# Patient Record
Sex: FEMALE | Race: WHITE | NOT HISPANIC OR LATINO | Employment: FULL TIME
[De-identification: names, ages, dates, MRNs, and addresses within clinical notes are randomized per-mention and may not be internally consistent; named-entity substitution may affect disease eponyms.]

---

## 2017-02-22 ENCOUNTER — RECORDS - HEALTHEAST (OUTPATIENT)
Dept: ADMINISTRATIVE | Facility: OTHER | Age: 32
End: 2017-02-22

## 2017-02-24 ENCOUNTER — RECORDS - HEALTHEAST (OUTPATIENT)
Dept: ADMINISTRATIVE | Facility: OTHER | Age: 32
End: 2017-02-24

## 2017-02-25 ENCOUNTER — RECORDS - HEALTHEAST (OUTPATIENT)
Dept: ADMINISTRATIVE | Facility: OTHER | Age: 32
End: 2017-02-25

## 2017-03-06 ENCOUNTER — RECORDS - HEALTHEAST (OUTPATIENT)
Dept: ADMINISTRATIVE | Facility: OTHER | Age: 32
End: 2017-03-06

## 2017-04-05 ENCOUNTER — RECORDS - HEALTHEAST (OUTPATIENT)
Dept: ADMINISTRATIVE | Facility: OTHER | Age: 32
End: 2017-04-05

## 2017-04-07 ENCOUNTER — RECORDS - HEALTHEAST (OUTPATIENT)
Dept: ADMINISTRATIVE | Facility: OTHER | Age: 32
End: 2017-04-07

## 2017-04-08 ENCOUNTER — RECORDS - HEALTHEAST (OUTPATIENT)
Dept: ADMINISTRATIVE | Facility: OTHER | Age: 32
End: 2017-04-08

## 2017-04-09 ENCOUNTER — RECORDS - HEALTHEAST (OUTPATIENT)
Dept: ADMINISTRATIVE | Facility: OTHER | Age: 32
End: 2017-04-09

## 2017-04-10 ENCOUNTER — RECORDS - HEALTHEAST (OUTPATIENT)
Dept: ADMINISTRATIVE | Facility: OTHER | Age: 32
End: 2017-04-10

## 2017-04-12 ENCOUNTER — AMBULATORY - HEALTHEAST (OUTPATIENT)
Dept: LAB | Facility: CLINIC | Age: 32
End: 2017-04-12

## 2017-04-12 ENCOUNTER — RECORDS - HEALTHEAST (OUTPATIENT)
Dept: ADMINISTRATIVE | Facility: OTHER | Age: 32
End: 2017-04-12

## 2017-04-12 DIAGNOSIS — O20.0 THREATENED ABORTION: ICD-10-CM

## 2017-04-12 DIAGNOSIS — O02.81 INAPPROPRIATE CHANGE IN QUANTITATIVE HCG IN EARLY PREGNANCY: ICD-10-CM

## 2017-04-14 ENCOUNTER — AMBULATORY - HEALTHEAST (OUTPATIENT)
Dept: LAB | Facility: CLINIC | Age: 32
End: 2017-04-14

## 2017-04-14 DIAGNOSIS — O02.81 CHEMICAL PREGNANCY: ICD-10-CM

## 2017-04-14 DIAGNOSIS — O20.0 THREATENED ABORTION, UNSPECIFIED AS TO EPISODE OF CARE: ICD-10-CM

## 2017-04-17 ENCOUNTER — AMBULATORY - HEALTHEAST (OUTPATIENT)
Dept: LAB | Facility: CLINIC | Age: 32
End: 2017-04-17

## 2017-04-17 DIAGNOSIS — O02.81 CHEMICAL PREGNANCY: ICD-10-CM

## 2017-04-17 DIAGNOSIS — O20.0 THREATENED ABORTION, UNSPECIFIED AS TO EPISODE OF CARE: ICD-10-CM

## 2017-04-19 ENCOUNTER — AMBULATORY - HEALTHEAST (OUTPATIENT)
Dept: LAB | Facility: CLINIC | Age: 32
End: 2017-04-19

## 2017-04-19 DIAGNOSIS — O20.0 ABORTION, THREATENED: ICD-10-CM

## 2017-04-21 ENCOUNTER — AMBULATORY - HEALTHEAST (OUTPATIENT)
Dept: LAB | Facility: CLINIC | Age: 32
End: 2017-04-21

## 2017-04-21 ENCOUNTER — TELEPHONE (OUTPATIENT)
Dept: OBGYN | Facility: CLINIC | Age: 32
End: 2017-04-21

## 2017-04-21 ENCOUNTER — INFUSION - HEALTHEAST (OUTPATIENT)
Dept: INFUSION THERAPY | Facility: CLINIC | Age: 32
End: 2017-04-21

## 2017-04-21 DIAGNOSIS — O20.0 THREATENED ABORTION: ICD-10-CM

## 2017-04-21 DIAGNOSIS — O02.81 CHEMICAL PREGNANCY: ICD-10-CM

## 2017-04-21 ASSESSMENT — MIFFLIN-ST. JEOR: SCORE: 1338.18

## 2017-04-21 NOTE — TELEPHONE ENCOUNTER
Patient is pregnant and calling to get a second opinion as she is seen at a clinic in [place]. Discussed with patient that we would need her to send her records to the clinic to be reviewed by the provider and schedule a consult appointment. Pt stated that she needs to be seen sooner than that. Pt unsure of when her LMP was, but stated that she ovulated on 3/15. Pt had 2 ultrasounds done 1.5 weeks apart and neither showed a pregnancy. Pt has had her hcg level checked and it was increasing appropriately, but the last time only increased 50%. Pt's doctor advised that she have surgery or be given Methotrexate due to ectopic pregnancy. Pt doesn't believe she has an ectopic pregnancy and not having any symptoms. Discussed with patient that she should call her current clinic and ask what other options she has - repeat u/s, repeat hcg, etc. Advised patient to present to ED if she does start to have any heavy bleeding or symptoms of ectopic pregnancy. Pt stated that she will call her clinic and if she decides she wants a second opinion she will have her records sent to us.   Mel Martinez RN-BSN

## 2017-04-24 ENCOUNTER — AMBULATORY - HEALTHEAST (OUTPATIENT)
Dept: LAB | Facility: CLINIC | Age: 32
End: 2017-04-24

## 2017-04-24 ENCOUNTER — RECORDS - HEALTHEAST (OUTPATIENT)
Dept: ADMINISTRATIVE | Facility: OTHER | Age: 32
End: 2017-04-24

## 2017-04-24 DIAGNOSIS — O00.90 ECTOPIC PREGNANCY: ICD-10-CM

## 2017-04-24 LAB
ALT SERPL W P-5'-P-CCNC: 14 U/L (ref 0–45)
AST SERPL W P-5'-P-CCNC: 14 U/L (ref 0–40)
CREAT SERPL-MCNC: 0.76 MG/DL (ref 0.6–1.1)
GFR SERPL CREATININE-BSD FRML MDRD: >60 ML/MIN/1.73M2

## 2017-04-27 ENCOUNTER — AMBULATORY - HEALTHEAST (OUTPATIENT)
Dept: LAB | Facility: CLINIC | Age: 32
End: 2017-04-27

## 2017-04-27 DIAGNOSIS — O00.90 UNSPECIFIED ECTOPIC PREGNANCY WITHOUT INTRAUTERINE PREGNANCY: ICD-10-CM

## 2017-04-30 ENCOUNTER — AMBULATORY - HEALTHEAST (OUTPATIENT)
Dept: LAB | Facility: CLINIC | Age: 32
End: 2017-04-30

## 2017-04-30 DIAGNOSIS — O00.90 ECTOPIC FETUS: ICD-10-CM

## 2017-04-30 LAB
ALT SERPL W P-5'-P-CCNC: 17 U/L (ref 0–45)
CREAT SERPL-MCNC: 0.77 MG/DL (ref 0.6–1.1)
GFR SERPL CREATININE-BSD FRML MDRD: >60 ML/MIN/1.73M2

## 2017-05-01 ENCOUNTER — RECORDS - HEALTHEAST (OUTPATIENT)
Dept: ADMINISTRATIVE | Facility: OTHER | Age: 32
End: 2017-05-01

## 2017-05-07 ENCOUNTER — AMBULATORY - HEALTHEAST (OUTPATIENT)
Dept: LAB | Facility: CLINIC | Age: 32
End: 2017-05-07

## 2017-05-07 DIAGNOSIS — O00.90 ECTOPIC PREGNANCY: ICD-10-CM

## 2017-05-17 ENCOUNTER — RECORDS - HEALTHEAST (OUTPATIENT)
Dept: ADMINISTRATIVE | Facility: OTHER | Age: 32
End: 2017-05-17

## 2017-07-17 ENCOUNTER — RECORDS - HEALTHEAST (OUTPATIENT)
Dept: ADMINISTRATIVE | Facility: OTHER | Age: 32
End: 2017-07-17

## 2017-07-31 ENCOUNTER — RECORDS - HEALTHEAST (OUTPATIENT)
Dept: ADMINISTRATIVE | Facility: OTHER | Age: 32
End: 2017-07-31

## 2017-08-30 ENCOUNTER — OFFICE VISIT - HEALTHEAST (OUTPATIENT)
Dept: MIDWIFE SERVICES | Facility: CLINIC | Age: 32
End: 2017-08-30

## 2017-08-30 DIAGNOSIS — Z32.00 POSSIBLE PREGNANCY: ICD-10-CM

## 2017-08-30 ASSESSMENT — MIFFLIN-ST. JEOR: SCORE: 1347.25

## 2017-08-31 ENCOUNTER — COMMUNICATION - HEALTHEAST (OUTPATIENT)
Dept: MIDWIFE SERVICES | Facility: CLINIC | Age: 32
End: 2017-08-31

## 2017-09-01 ENCOUNTER — AMBULATORY - HEALTHEAST (OUTPATIENT)
Dept: LAB | Facility: CLINIC | Age: 32
End: 2017-09-01

## 2017-09-01 DIAGNOSIS — Z32.00 POSSIBLE PREGNANCY: ICD-10-CM

## 2017-09-05 ENCOUNTER — COMMUNICATION - HEALTHEAST (OUTPATIENT)
Dept: MIDWIFE SERVICES | Facility: CLINIC | Age: 32
End: 2017-09-05

## 2017-09-05 ENCOUNTER — AMBULATORY - HEALTHEAST (OUTPATIENT)
Dept: MIDWIFE SERVICES | Facility: CLINIC | Age: 32
End: 2017-09-05

## 2017-09-05 ENCOUNTER — AMBULATORY - HEALTHEAST (OUTPATIENT)
Dept: OBGYN | Facility: CLINIC | Age: 32
End: 2017-09-05

## 2017-09-05 DIAGNOSIS — R79.89 ELEVATED SERUM HCG: ICD-10-CM

## 2017-09-06 ENCOUNTER — AMBULATORY - HEALTHEAST (OUTPATIENT)
Dept: LAB | Facility: CLINIC | Age: 32
End: 2017-09-06

## 2017-09-06 DIAGNOSIS — R79.89 ELEVATED SERUM HCG: ICD-10-CM

## 2017-09-14 ENCOUNTER — COMMUNICATION - HEALTHEAST (OUTPATIENT)
Dept: MIDWIFE SERVICES | Facility: CLINIC | Age: 32
End: 2017-09-14

## 2017-09-14 DIAGNOSIS — Z32.01 PREGNANCY EXAMINATION OR TEST, POSITIVE RESULT: ICD-10-CM

## 2017-09-22 ENCOUNTER — HOSPITAL ENCOUNTER (OUTPATIENT)
Dept: ULTRASOUND IMAGING | Facility: CLINIC | Age: 32
Discharge: HOME | End: 2017-09-22
Attending: ADVANCED PRACTICE MIDWIFE

## 2017-09-22 DIAGNOSIS — Z32.01 PREGNANCY EXAMINATION OR TEST, POSITIVE RESULT: ICD-10-CM

## 2017-09-26 ENCOUNTER — OFFICE VISIT (OUTPATIENT)
Dept: URGENT CARE | Facility: URGENT CARE | Age: 32
End: 2017-09-26
Payer: COMMERCIAL

## 2017-09-26 VITALS
BODY MASS INDEX: 19.7 KG/M2 | HEART RATE: 100 BPM | HEIGHT: 68 IN | WEIGHT: 130 LBS | SYSTOLIC BLOOD PRESSURE: 120 MMHG | DIASTOLIC BLOOD PRESSURE: 69 MMHG | OXYGEN SATURATION: 99 % | TEMPERATURE: 101.9 F

## 2017-09-26 DIAGNOSIS — J06.9 VIRAL URI WITH COUGH: Primary | ICD-10-CM

## 2017-09-26 PROCEDURE — 99213 OFFICE O/P EST LOW 20 MIN: CPT | Performed by: FAMILY MEDICINE

## 2017-09-26 RX ORDER — DIPHENOXYLATE HYDROCHLORIDE AND ATROPINE SULFATE 2.5; .025 MG/1; MG/1
1 TABLET ORAL
COMMUNITY
Start: 2015-11-23

## 2017-09-26 NOTE — MR AVS SNAPSHOT
"              After Visit Summary   2017    Maria Elena Bacon    MRN: [id number]           Patient Information     Date Of Birth          1985        Visit Information        Provider Department      2017 6:55 PM Edgar Dhillon MD [facility]        Today's Diagnoses     Viral URI with cough    -  1       Follow-ups after your visit        Who to contact     If you have questions or need follow up information about today's clinic visit or your schedule please contact [facility] directly at [phone].  Normal or non-critical lab and imaging results will be communicated to you by [employer], letter or phone within 4 business days after the clinic has received the results. If you do not hear from us within 7 days, please contact the clinic through [employer] or phone. If you have a critical or abnormal lab result, we will notify you by phone as soon as possible.  Submit refill requests through [employer] or call your pharmacy and they will forward the refill request to us. Please allow 3 business days for your refill to be completed.          Additional Information About Your Visit        MyChart Information     [employer] lets you send messages to your doctor, view your test results, renew your prescriptions, schedule appointments and more. To sign up, go to [email or web address] . Click on \"Log in\" on the left side of the screen, which will take you to the Welcome page. Then click on \"Sign up Now\" on the right side of the page.     You will be asked to enter the access code listed below, as well as some personal information. Please follow the directions to create your username and password.     Your access code is: [email or web address]  Expires: 2017  7:18 PM     Your access code will  in 90 days. If you need help or a new code, please call your [facility] clinic or [phone].        Care EveryWhere ID     This is your Care EveryWhere ID. This could be " "used by other organizations to access your [facility] medical records  [id number]        Your Vitals Were     Pulse Temperature Height Pulse Oximetry Breastfeeding? BMI (Body Mass Index)    100 101.9  F (38.8  C) (Tympanic) 5' 8\" (1.727 m) 99% No 19.77 kg/m2       Blood Pressure from Last 3 Encounters:   09/26/17 120/69   01/21/16 96/58   05/12/15 111/70    Weight from Last 3 Encounters:   09/26/17 130 lb (59 kg)   01/21/16 131 lb (59.4 kg)   05/12/15 150 lb (68 kg)              Today, you had the following     No orders found for display         Today's Medication Changes          These changes are accurate as of: 9/26/17  7:18 PM.  If you have any questions, ask your nurse or doctor.               Stop taking these medicines if you haven't already. Please contact your care team if you have questions.     ADVIL PO   Stopped by:  Edgar Dhillon MD           azithromycin 250 MG tablet   Commonly known as:  ZITHROMAX   Stopped by:  Edgar Dhillon MD           IRON PO   Stopped by:  Edgar Dhillon MD                    Primary Care Provider    None Specified       No primary provider on file.        Equal Access to Services     [facility]: Hadii alex Estrada, wajagrutida lutaya, qaybta kaalmada ann-marie, kinza bucio . So [facility] [phone].    ATENCIÓN: Si habla español, tiene a jones disposición servicios gratuitos de asistencia lingüística. Llame al [phone].    We comply with applicable federal civil rights laws and [place] laws. We do not discriminate on the basis of race, color, national origin, age, disability sex, sexual orientation or gender identity.            Thank you!     Thank you for choosing [facility]  for your care. Our goal is always to provide you with excellent care. Hearing back from our patients is one way we can continue to improve our services. Please take a few minutes to complete the written survey that you may " receive in the mail after your visit with us. Thank you!             Your Updated Medication List - Protect others around you: Learn how to safely use, store and throw away your medicines at www.disposemymeds.org.          This list is accurate as of: 9/26/17  7:18 PM.  Always use your most recent med list.                   Brand Name Dispense Instructions for use Diagnosis    MULTI-VITAMINS Tabs      Take 1 tablet by mouth

## 2017-09-27 ENCOUNTER — COMMUNICATION - HEALTHEAST (OUTPATIENT)
Dept: MIDWIFE SERVICES | Facility: CLINIC | Age: 32
End: 2017-09-27

## 2017-09-27 DIAGNOSIS — O26.849 FETAL SIZE INCONSISTENT WITH DATES: ICD-10-CM

## 2017-09-27 DIAGNOSIS — N92.6 MISSED MENSES: ICD-10-CM

## 2017-09-27 NOTE — NURSING NOTE
"Chief Complaint   Patient presents with     Urgent Care     Fever     c/o fever,chills and cough for 1 day       Initial /69  Pulse 100  Temp 101.9  F (38.8  C) (Tympanic)  Ht 5' 8\" (1.727 m)  Wt 130 lb (59 kg)  SpO2 99%  Breastfeeding? No  BMI 19.77 kg/m2 Estimated body mass index is 19.77 kg/(m^2) as calculated from the following:    Height as of this encounter: 5' 8\" (1.727 m).    Weight as of this encounter: 130 lb (59 kg).  Medication Reconciliation: complete   Sapphire Perez MA    "

## 2017-09-27 NOTE — PROGRESS NOTES
Subjective: Yesterday she felt cold and then had a temperature in the night, developed cough and congestion, bad taste. She figures it's like a cold but because of fever or if her since she is in early pregnancy. This is her third pregnancy. One of her children at home has had a cold for a couple of weeks.    Objective: She appears well. HEENT is pretty much normal. Neck is normal. Lungs are clear. Coughing periodically. Heart is regular without murmurs    Assessment and plan: Viral URI. It makes logical sense to try to keep the temperature from getting more than 101 and she can use Tylenol when that happens but otherwise this should run its course, watch for secondary infections like sinus infections, but mention this to her OB provider.

## 2017-09-28 ENCOUNTER — HOSPITAL ENCOUNTER (OUTPATIENT)
Dept: ULTRASOUND IMAGING | Facility: CLINIC | Age: 32
Discharge: HOME | End: 2017-09-28
Attending: MIDWIFE

## 2017-09-28 ENCOUNTER — COMMUNICATION - HEALTHEAST (OUTPATIENT)
Dept: MIDWIFE SERVICES | Facility: CLINIC | Age: 32
End: 2017-09-28

## 2017-09-28 ENCOUNTER — COMMUNICATION - HEALTHEAST (OUTPATIENT)
Dept: OBGYN | Facility: CLINIC | Age: 32
End: 2017-09-28

## 2017-09-28 DIAGNOSIS — O26.849 FETAL SIZE INCONSISTENT WITH DATES: ICD-10-CM

## 2017-09-28 DIAGNOSIS — N92.6 MISSED MENSES: ICD-10-CM

## 2017-09-29 ENCOUNTER — COMMUNICATION - HEALTHEAST (OUTPATIENT)
Dept: OBGYN | Facility: CLINIC | Age: 32
End: 2017-09-29

## 2017-10-19 ENCOUNTER — OFFICE VISIT - HEALTHEAST (OUTPATIENT)
Dept: OBGYN | Facility: CLINIC | Age: 32
End: 2017-10-19

## 2017-10-19 ENCOUNTER — COMMUNICATION - HEALTHEAST (OUTPATIENT)
Dept: ADMINISTRATIVE | Facility: CLINIC | Age: 32
End: 2017-10-19

## 2017-10-19 DIAGNOSIS — Z87.59 HISTORY OF ECTOPIC PREGNANCY: ICD-10-CM

## 2017-10-19 DIAGNOSIS — O03.9 SAB (SPONTANEOUS ABORTION): ICD-10-CM

## 2017-10-19 DIAGNOSIS — N96 HISTORY OF RECURRENT MISCARRIAGES, NOT CURRENTLY PREGNANT: ICD-10-CM

## 2017-10-19 ASSESSMENT — MIFFLIN-ST. JEOR: SCORE: 1333.64

## 2017-10-26 ENCOUNTER — COMMUNICATION - HEALTHEAST (OUTPATIENT)
Dept: OBGYN | Facility: CLINIC | Age: 32
End: 2017-10-26

## 2017-12-08 ENCOUNTER — COMMUNICATION - HEALTHEAST (OUTPATIENT)
Dept: OBGYN | Facility: CLINIC | Age: 32
End: 2017-12-08

## 2018-08-20 ENCOUNTER — HOME CARE/HOSPICE - HEALTHEAST (OUTPATIENT)
Dept: HOME HEALTH SERVICES | Facility: HOME HEALTH | Age: 33
End: 2018-08-20

## 2020-09-28 ENCOUNTER — AMBULATORY - HEALTHEAST (OUTPATIENT)
Dept: OBGYN | Facility: CLINIC | Age: 35
End: 2020-09-28

## 2020-09-28 DIAGNOSIS — Z3A.40 40 WEEKS GESTATION OF PREGNANCY: ICD-10-CM

## 2020-10-01 ENCOUNTER — AMBULATORY - HEALTHEAST (OUTPATIENT)
Dept: LAB | Facility: CLINIC | Age: 35
End: 2020-10-01

## 2020-10-01 DIAGNOSIS — Z3A.40 40 WEEKS GESTATION OF PREGNANCY: ICD-10-CM

## 2020-10-02 LAB
SARS-COV-2 PCR COMMENT: NORMAL
SARS-COV-2 RNA SPEC QL NAA+PROBE: NEGATIVE
SARS-COV-2 VIRUS SPECIMEN SOURCE: NORMAL

## 2020-10-03 ENCOUNTER — ANESTHESIA - HEALTHEAST (OUTPATIENT)
Dept: OBGYN | Facility: HOSPITAL | Age: 35
End: 2020-10-03

## 2020-10-03 ENCOUNTER — RECORDS - HEALTHEAST (OUTPATIENT)
Dept: LAB | Facility: HOSPITAL | Age: 35
End: 2020-10-03

## 2020-10-03 ENCOUNTER — COMMUNICATION - HEALTHEAST (OUTPATIENT)
Dept: SCHEDULING | Facility: CLINIC | Age: 35
End: 2020-10-03

## 2020-10-03 LAB
HBV SURFACE AG SERPL QL IA: NEGATIVE
HIV 1,2 ANTIBODY: NEGATIVE

## 2020-10-04 LAB — HIV 1+2 AB+HIV1 P24 AG SERPL QL IA: NEGATIVE

## 2020-10-05 ENCOUNTER — HOME CARE/HOSPICE - HEALTHEAST (OUTPATIENT)
Dept: HOME HEALTH SERVICES | Facility: HOME HEALTH | Age: 35
End: 2020-10-05

## 2020-10-05 LAB — HCV AB SERPL QL IA: NEGATIVE

## 2020-10-08 ENCOUNTER — COMMUNICATION - HEALTHEAST (OUTPATIENT)
Dept: HOME HEALTH SERVICES | Facility: HOME HEALTH | Age: 35
End: 2020-10-08

## 2021-05-30 VITALS — BODY MASS INDEX: 19.7 KG/M2 | HEIGHT: 68 IN | WEIGHT: 130 LBS

## 2021-05-31 VITALS — BODY MASS INDEX: 20 KG/M2 | HEIGHT: 68 IN | WEIGHT: 132 LBS

## 2021-05-31 VITALS — BODY MASS INDEX: 19.55 KG/M2 | HEIGHT: 68 IN | WEIGHT: 129 LBS

## 2021-06-10 NOTE — PROGRESS NOTES
Rebecca Rodriguez RN had an in depth discussion with patient regarding the medication that was to be administered. The patient had questions that she wanted the MD to answer prior to receiving. Medication not administered per patient request.

## 2021-06-12 NOTE — TELEPHONE ENCOUNTER
Hello,    Recently an order was placed with [facility] to perform a one-time skilled nursing visit following delivery and discharge.     I spoke with patient briefly on Tuesday, who stated that she would like to follow up with me on Wednesday if she wanted a visit. I did not hear back from her, and called her again and left a voicemail.     Due to no call back from the patient, I will now assume that home visit is not wanted and discard our current order.    If you have any questions or concerns , please call our office at [phone].    Thank you,    Adela Crespo  Patient   [facility]

## 2021-06-12 NOTE — PROGRESS NOTES
Pregnancy Confirmation Visit:     Assessment:   Pregnancy confirmation visit.   UPT in clinic negative.       Plan:   1.) Discussed maternity care options at [facility]- philosophy, care models, and locations.   2.) Medical, surgical, family and obstetrical history reviewed.   3.) Beta hcg drawn today, if elevated, will have patient redraw in 2 days, if negative patient will do another UPT at home after missed menses   4.) CNM contact numbers provided to patient   5.) All questions answered   6.) RTO prn    TT with patient 20 mns, >50% time spent in counseling or coordination of care.     Subjective:   Maria Elena Bacon is a31 y.o., here for pregnancy confirmation visit.   This is an planned pregnancy. She is hapyy to be pregnant.    UPT was positive at home on 8-29-17. LMP 8-3-17, sure.   Has had regular cycles [id number].    Symptoms of pregnancy include: none.    Questions today regarding how her test at home was positive but negative in clinic today.        Objective:     Physical exam deferred until IOB visit

## 2021-06-12 NOTE — ANESTHESIA PREPROCEDURE EVALUATION
Anesthesia Evaluation      Patient summary reviewed   No history of anesthetic complications     Airway    Pulmonary    (-) not a smoker                         Cardiovascular - negative ROS     ROS comment: MTHFR - no anticoagulants. No bleeding d/os.     Neuro/Psych - negative ROS     Endo/Other    (+) pregnant  (-) no obesity     Comments: 9.5 cm    GI/Hepatic/Renal    (+) GERD,             Dental                         Anesthesia Plan  Planned anesthetic: ITN    ASA 2   Induction: intravenous   Anesthetic plan and risks discussed with: patient and spouse  Anesthesia plan special considerations: rapid sequence induction, increased risk of difficult airway, antiemetics,   Post-op plan: routine recovery

## 2021-06-12 NOTE — ANESTHESIA PROCEDURE NOTES
Spinal Block    Patient location during procedure: OB  Start time: 10/3/2020 3:02 PM  End time: 10/3/2020 3:05 PM  Reason for block: primary anesthetic    Staffing:  Performing  Anesthesiologist: Crys Damico MD    Preanesthetic Checklist  Completed: patient identified, risks, benefits, and alternatives discussed, timeout performed, consent obtained, airway assessed, oxygen available, suction available, emergency drugs available and hand hygiene performed  Spinal Block  Patient position: sitting  Prep: ChloraPrep and site prepped and draped  Patient monitoring: heart rate, continuous pulse ox, blood pressure and cardiac monitor  Approach: midline  Location: L3-4  Injection technique: single-shot  Needle type: pencil-tip   Needle gauge: 24 G

## 2021-06-12 NOTE — PROGRESS NOTES
Pt informed of bHCG today as requested via [employer]. Recommend repeat bHCG in the next 1-2 days to see trend. Order entered, enc pt to make lab-only appt.

## 2021-06-12 NOTE — ANESTHESIA POSTPROCEDURE EVALUATION
Patient: Maria Elena Bacon  * No procedures listed *  Anesthesia type: ITN    Patient location: Labor and Delivery  Last vitals: No vitals data found for the desired time range.    Post vital signs: stable  Level of consciousness: awake and responds to simple questions  Post-anesthesia pain: pain controlled  Post-anesthesia nausea and vomiting: no  Pulmonary: unassisted, return to baseline  Cardiovascular: stable and blood pressure at baseline  Hydration: adequate  Anesthetic events: no    QCDR Measures:  ASA# 11 - Ana-op Cardiac Arrest: ASA11B - Patient did NOT experience unanticipated cardiac arrest  ASA# 12 - Ana-op Mortality Rate: ASA12B - Patient did NOT die  ASA# 13 - PACU Re-Intubation Rate: NA - No ETT / LMA used for case  ASA# 10 - Composite Anes Safety: ASA10A - No serious adverse event    Additional Notes:    Patient is comfortable s/p labor IT anesthetic. Patient is satisfied with her anesthetic and recovering well. Patient's sensory and motor function in LE are returning to baseline, emptying bladder as expected, minimal to no tenderness at neuraxial insertion site. Advised patient to alert her nurses, physicians, department of anesthesia if anything changes.

## 2021-06-13 NOTE — PROGRESS NOTES
CC: The patient is being seen as a consult from the [facility] secondary to recurrent miscarriages.    HPI: The pt is a 32 y.o. MWF  who presents with 2 miscarriages in the last year along with an ectopic pregnancy.  She has had 2 normal pregnancies in the last 5 years.  She breast fed both of them for 2 years each.  She doesn't get periods while she is breast feeding.  She stopped breast feeding at the end of Dec, 2016.  She had a negative home UPT at the end of Jan.  She started bleeding in Feb; she took a pregnancy test in the 3rd week of bleeding that was positive.  The bleeding last about 4 weeks total.  She was seen by Dr Freeman and had hCG levels in the 100s, peaking around 120 per her report.  Those records are not available today.  The hCG then returned to negative.  She got pregnant again in April.  She had rising hCG levels but no intrauterine or ectopic pregnancy was seen on ultrasound despite levels being up to 3352.  She was treated as an ectopic pregnancy since not even a gestational or yolk sac were seen.  She received methotrexate at the end of April and her hCG levels resolved.  This also was through [facility].  Some records are available from that time.  She had periods every 28-29 days over the summer.  She has ovulation pain around day 14-15.  She had a progesterone level drawn in July at [facility].  She states it was normal.  Those records aren't available today.  She then got pregnant again in Sept.  She had a 6 week loss, initially having an ultrasound that showed an IUP with a heart rate that disappeared over the next week.  At the same time she was having fevers to 103; initially she was diagnosed with a viral infection, but after 4-5 days she was diagnosed with pneumonia and started on antibiotics.  She bled heavily for 4-5 days and overall for about 10 days.  She has not had a follow up hCG to make sure it resolved.    Past Medical History:   Diagnosis Date  "    Varicella        Past Surgical History:   Procedure Laterality Date     NO PAST SURGERIES         Patient's   Family History   Problem Relation Age of Onset     No Medical Problems Mother      No Medical Problems Father      Arthritis Maternal Grandmother      Dementia Maternal Grandfather      Heart disease Paternal Grandmother      Dementia Paternal Grandfather        Patient   Social History     Social History     Marital status:      Spouse name: N/A     Number of children: 2     Years of education: N/A     Occupational History      [employer]     Social History Main Topics     Smoking status: Never Smoker     Smokeless tobacco: Never Used     Alcohol use No      Comment: none with pregnancy     Drug use: No     Sexual activity: Yes     Partners: Male     Other Topics Concern     None     Social History Narrative    Patient stated to have a chemical pregnancy in February 2017 as the miscarriage happened at 4 weeks. (4/22/2017)       Current Outpatient Prescriptions   Medication Sig Dispense Refill     prenatal vitamin iron-folic acid 27mg-0.8mg (PRENATAL S) 27 mg iron- 800 mcg Tab tablet Take 1 tablet by mouth daily.       No current facility-administered medications for this visit.        Patient has No Known Allergies.    ROS:  12 part ROS is negative aside from those symptoms in the HPI    PE:  BP 98/56 (Patient Site: Right Arm, Patient Position: Sitting, Cuff Size: Adult Regular)  Pulse 64  Ht 5' 8\" (1.727 m)  Wt 129 lb (58.5 kg)  LMP 08/03/2017 (Exact Date)  Breastfeeding? No  BMI 19.61 kg/m2          Body mass index is 19.61 kg/(m^2).    General: WN/WD WF, NAD  Psych: normal mood  Neuro: CN I-XII grossly intact  MS: normal gait    Assessment: 32 y.o. MWF  with recurrent miscarriages.    Plan: Natural history of miscarriages and ectopics discussed with the patient.  We discussed that I need to see the records from the early loss as it may have been something like a blighted " ovum rather than a spontaneous miscarriage.  I also want to see the records from the progesterone level drawn over the summer as low progesterone could play into her situation.  We also discussed that with her second miscarriage, it is possible that the pneumonia with significant fever could have played a role in such a fragile part of the pregnancy.  Record releases were signed today.  Quantitative hCG was drawn today.  We discussed that her period will likely return by mid Nov assuming her hCG is near negative.  At this time, depending on the progesterone level, no labs will be drawn for the miscarriages.  If the progesterone from July is questionable, then we will draw that again.  Otherwise, she will attempt pregnancy again when she feels ready.    Approximately 30 minutes were spent with the patient with the majority in counseling.

## 2021-06-16 PROBLEM — O03.9 SAB (SPONTANEOUS ABORTION): Status: ACTIVE | Noted: 2017-09-28

## 2021-06-16 PROBLEM — Z34.90 PREGNANT: Status: ACTIVE | Noted: 2020-10-03

## 2021-06-16 PROBLEM — R79.89 ELEVATED SERUM HCG: Status: ACTIVE | Noted: 2017-09-05

## 2021-06-16 PROBLEM — Z87.59 HISTORY OF ECTOPIC PREGNANCY: Status: ACTIVE | Noted: 2017-08-30

## 2021-07-03 NOTE — ADDENDUM NOTE
Addendum Note by Maki Nguyen APRN, CNM at 8/31/2017  7:40 AM     Author: Maki Nguyen APRN, CNM Service: -- Author Type: Midwife    Filed: 8/31/2017  7:40 AM Encounter Date: 8/30/2017 Status: Signed    : Maki Nguyen APRN, CNM (Midwife)    Addended by: MAKI NGUYEN on: 8/31/2017 07:40 AM        Modules accepted: Orders

## 2021-07-14 PROBLEM — Z34.90 PREGNANT: Status: RESOLVED | Noted: 2018-08-16 | Resolved: 2018-08-18

## 2021-08-08 ENCOUNTER — HEALTH MAINTENANCE LETTER (OUTPATIENT)
Age: 36
End: 2021-08-08

## 2021-10-03 ENCOUNTER — HEALTH MAINTENANCE LETTER (OUTPATIENT)
Age: 36
End: 2021-10-03

## 2022-09-10 ENCOUNTER — HEALTH MAINTENANCE LETTER (OUTPATIENT)
Age: 37
End: 2022-09-10

## 2023-11-22 ENCOUNTER — LAB REQUISITION (OUTPATIENT)
Dept: LAB | Facility: CLINIC | Age: 38
End: 2023-11-22

## 2023-11-22 DIAGNOSIS — Z12.4 ENCOUNTER FOR SCREENING FOR MALIGNANT NEOPLASM OF CERVIX: ICD-10-CM

## 2023-11-22 PROCEDURE — G0124 SCREEN C/V THIN LAYER BY MD: HCPCS

## 2023-11-22 PROCEDURE — G0145 SCR C/V CYTO,THINLAYER,RESCR: HCPCS | Performed by: OBSTETRICS & GYNECOLOGY

## 2023-11-22 PROCEDURE — 87624 HPV HI-RISK TYP POOLED RSLT: CPT | Performed by: OBSTETRICS & GYNECOLOGY

## 2023-11-30 LAB
BKR LAB AP GYN ADEQUACY: ABNORMAL
BKR LAB AP GYN INTERPRETATION: ABNORMAL
BKR LAB AP HPV REFLEX: ABNORMAL
BKR LAB AP LMP: ABNORMAL
BKR LAB AP PREVIOUS ABNL DX: ABNORMAL
BKR LAB AP PREVIOUS ABNORMAL: ABNORMAL
PATH REPORT.COMMENTS IMP SPEC: ABNORMAL
PATH REPORT.COMMENTS IMP SPEC: ABNORMAL
PATH REPORT.RELEVANT HX SPEC: ABNORMAL

## 2023-12-04 LAB
HUMAN PAPILLOMA VIRUS 16 DNA: NEGATIVE
HUMAN PAPILLOMA VIRUS 18 DNA: NEGATIVE
HUMAN PAPILLOMA VIRUS FINAL DIAGNOSIS: NORMAL
HUMAN PAPILLOMA VIRUS OTHER HR: NEGATIVE

## 2023-12-10 ENCOUNTER — HEALTH MAINTENANCE LETTER (OUTPATIENT)
Age: 38
End: 2023-12-10

## 2024-03-01 ENCOUNTER — VIRTUAL VISIT (OUTPATIENT)
Dept: PEDIATRICS | Facility: CLINIC | Age: 39
End: 2024-03-01
Payer: COMMERCIAL

## 2024-03-01 DIAGNOSIS — G43.109 OCULAR MIGRAINE: Primary | ICD-10-CM

## 2024-03-01 PROCEDURE — 99213 OFFICE O/P EST LOW 20 MIN: CPT | Mod: 95 | Performed by: NURSE PRACTITIONER

## 2024-03-01 NOTE — PROGRESS NOTES
Maria Elena is a 38 year old who is being evaluated via a billable video visit.      How would you like to obtain your AVS? MyChart  If the video visit is dropped, the invitation should be resent by: Text to cell phone: [phone]  Will anyone else be joining your video visit? No          Assessment & Plan     Ocular migraine  Discussed ocular migraines today.  Patient requesting further work-up.  Referral and MRI ordered for patient.  Follow-up as needed.  ER with new or worsening symptoms  - Adult Neurology  Referral; Future  - MR Brain w/o Contrast; Future      Subjective   Maria Elena is a 38 year old, presenting for the following health issues:    No chief complaint on file.    HPI     Here for follow-up regarding recent ocular migraine.    Has had two episodes--one before Christmas and the other in early February.   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   The February migraine happened after a run  Denies blurred vision, double vision, or floaters. Recent optometry exam normal.  Vision changes occur in both eyes abruptly, last 15 minutes, and then resolve  Both episodes were followed with a headache.         Review of Systems  Constitutional, HEENT, cardiovascular, pulmonary, gi and gu systems are negative, except as otherwise noted.      Objective           Vitals:  No vitals were obtained today due to virtual visit.    Physical Exam   GENERAL: alert and no distress  EYES: Eyes grossly normal to inspection.  No discharge or erythema, or obvious scleral/conjunctival abnormalities.  RESP: No audible wheeze, cough, or visible cyanosis.    SKIN: Visible skin clear. No significant rash, abnormal pigmentation or lesions.  NEURO: Cranial nerves grossly intact.  Mentation and speech appropriate for age.  PSYCH: Appropriate affect, tone, and pace of words        Video-Visit Details    Type of service:  Video Visit     Originating Location (pt. Location): Home    Distant Location  (provider location):  On-site  Platform used for Video Visit: Zaynab  Signed Electronically by: Misty Bustamante NP

## 2025-01-11 ENCOUNTER — HEALTH MAINTENANCE LETTER (OUTPATIENT)
Age: 40
End: 2025-01-11